# Patient Record
Sex: FEMALE | URBAN - NONMETROPOLITAN AREA
[De-identification: names, ages, dates, MRNs, and addresses within clinical notes are randomized per-mention and may not be internally consistent; named-entity substitution may affect disease eponyms.]

---

## 2021-12-01 ENCOUNTER — TELEPHONE (OUTPATIENT)
Dept: FAMILY MEDICINE CLINIC | Facility: CLINIC | Age: 51
End: 2021-12-01

## 2021-12-01 DIAGNOSIS — R53.1 WEAKNESS: ICD-10-CM

## 2021-12-01 DIAGNOSIS — R26.9 GAIT ABNORMALITY: Primary | ICD-10-CM

## 2021-12-01 DIAGNOSIS — I63.9 CEREBROVASCULAR ACCIDENT (CVA), UNSPECIFIED MECHANISM (HCC): ICD-10-CM

## 2021-12-01 NOTE — TELEPHONE ENCOUNTER
Patient order needs to say physical therapy once weekly for five weeks.     Dx: Gait.     Fax:[phone]

## 2021-12-01 NOTE — TELEPHONE ENCOUNTER
Estefanía a Physical therapist from [facility] called and left a voicemail that she is  is needing orders to continue Physical Therapy for 5 weeks. I called back and left message and asked her to return call and that we would need a good fax number for order.

## 2021-12-02 PROBLEM — R53.1 WEAKNESS: Status: ACTIVE | Noted: 2021-12-02

## 2021-12-02 PROBLEM — I63.9 CEREBROVASCULAR ACCIDENT (CVA): Status: ACTIVE | Noted: 2021-12-02

## 2021-12-02 PROBLEM — R26.9 GAIT ABNORMALITY: Status: ACTIVE | Noted: 2021-12-02

## 2021-12-09 ENCOUNTER — TELEPHONE (OUTPATIENT)
Dept: FAMILY MEDICINE CLINIC | Facility: CLINIC | Age: 51
End: 2021-12-09

## 2021-12-09 NOTE — TELEPHONE ENCOUNTER
"Pts son called and stated pt was not doing well at all. States vomiting and very \"swimmy headed\" I advised pts son to get her checked out at ER.  "